# Patient Record
Sex: FEMALE | Race: OTHER | HISPANIC OR LATINO
[De-identification: names, ages, dates, MRNs, and addresses within clinical notes are randomized per-mention and may not be internally consistent; named-entity substitution may affect disease eponyms.]

---
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
## 2025-01-24 ENCOUNTER — OFFICE (OUTPATIENT)
Dept: URBAN - METROPOLITAN AREA CLINIC 6 | Facility: CLINIC | Age: 20
Setting detail: OPHTHALMOLOGY
End: 2025-01-24
Payer: COMMERCIAL

## 2025-01-24 DIAGNOSIS — H10.45: ICD-10-CM

## 2025-01-24 PROCEDURE — 92014 COMPRE OPH EXAM EST PT 1/>: CPT | Performed by: OPHTHALMOLOGY

## 2025-01-24 ASSESSMENT — REFRACTION_MANIFEST
OU_VA: 20/20
OD_AXIS: 130
OD_VA1: 20/20
OD_SPHERE: 0.00
OS_VA1: 20/20
OD_CYLINDER: -0.25
OS_AXIS: 070
OS_CYLINDER: -0.25
OS_SPHERE: -0.50

## 2025-01-24 ASSESSMENT — REFRACTION_AUTOREFRACTION
OS_AXIS: 072
OS_CYLINDER: -0.25
OS_SPHERE: -1.00
OD_SPHERE: 0.00
OD_AXIS: 131
OS_AXIS: 65
OD_SPHERE: +0.50
OD_AXIS: 115
OD_CYLINDER: -0.25
OS_CYLINDER: -0.25
OD_CYLINDER: -0.25
OS_SPHERE: +0.50

## 2025-01-24 ASSESSMENT — VISUAL ACUITY
OD_BCVA: 20/20
OS_BCVA: 20/20

## 2025-01-24 ASSESSMENT — TONOMETRY
OD_IOP_MMHG: 18
OS_IOP_MMHG: 18

## 2025-01-24 ASSESSMENT — KERATOMETRY
OS_AXISANGLE_DEGREES: 101
OS_K2POWER_DIOPTERS: 45.25
OD_K1POWER_DIOPTERS: 44.25
OS_K1POWER_DIOPTERS: 44.50
OD_AXISANGLE_DEGREES: 087
OD_K2POWER_DIOPTERS: 45.25

## 2025-01-24 ASSESSMENT — CONFRONTATIONAL VISUAL FIELD TEST (CVF)
OS_FINDINGS: FULL
OD_FINDINGS: FULL

## 2025-04-01 ENCOUNTER — APPOINTMENT (OUTPATIENT)
Dept: DERMATOLOGY | Facility: CLINIC | Age: 20
End: 2025-04-01
Payer: COMMERCIAL

## 2025-04-01 PROCEDURE — 99204 OFFICE O/P NEW MOD 45 MIN: CPT

## 2025-07-11 ENCOUNTER — TRANSCRIPTION ENCOUNTER (OUTPATIENT)
Age: 20
End: 2025-07-11

## 2025-07-11 ENCOUNTER — APPOINTMENT (OUTPATIENT)
Dept: DERMATOLOGY | Facility: CLINIC | Age: 20
End: 2025-07-11
Payer: COMMERCIAL

## 2025-07-11 PROCEDURE — 99214 OFFICE O/P EST MOD 30 MIN: CPT

## 2025-09-12 ENCOUNTER — APPOINTMENT (OUTPATIENT)
Dept: DERMATOLOGY | Facility: CLINIC | Age: 20
End: 2025-09-12